# Patient Record
(demographics unavailable — no encounter records)

---

## 2020-01-01 NOTE — [id number]
[facility] History





-  Admission Detail


Date of Service: 20


Infant Delivery Method: Spontaneous Vaginal Delivery-Single





- Maternal History


Maternal MR Number: [id number]


: 1


Term: 0


Live Births: 1


Mother's Blood Type: B


Mother's Rh: Positive


Maternal Hepatitis B: Negative


Maternal STD: Negative (History of THC in urine in2020)


Maternal HIV: Negative


Maternal Group Beta Strep/GBS: Negative


Maternal VDRL: Negative


Maternal Urine Toxicology: Negative


Prenatal Care Received: Yes





- Delivery Data


APGAR Total Score 1 Minute: 9


APGAR Total Score 5 Minutes: 9


Resuscitation Effort: Bulb Suction, Dried and Stimulated, Place in Radiant 

Warmer


 Support Required: After Delivery of Infant





[facility] Nursery Information


Sex, Infant: Male


Weight: 3.42 kg


Length: 1 ft 9 in


Vital Signs: 


                                Last Vital Signs











Temp  99 F   20 12:15


 


Pulse  140   20 12:15


 


Resp  40   20 12:15


 


BP  71/57   20 12:15


 


Pulse Ox      











Head Circumference: 1 ft 1.75 in


Abdominal Girth: 1 ft 1 in


Bed Type: Open Crib





 Physician Exam





- Exam


Exam: See Below


Activity: Sleeping, Active





- [place] Scoring


Neuro Posture, NB: Flexion All Limbs


Neuro Square Window: Wrist 90 Degrees


Neuro Arm Recoil: Arm Recoil  Degrees


Neuro Popliteal Angle: Popliteal Angle 90 Degrees


Neuro Scarf Sign: Elbow at Same Side


Neuro Heel to Ear: Knee Bent to 90 Heel Reaches 90 Degrees from Prone


Neuro Maturity Score: 16


Physical Skin: Smooth, Pink, Visible Veins


Physical Lanugo: Mostly Bald


Physical Plantar Surface: Creases Over Entire Sole


Physical Breast: Full Areola, 5-10 mm Bud


Physical Eye/Ear: Thick Cartilage, Ear Stiff


Physical Genitals - Male: Testes Down, Good Rugae


Physical Maturity Score: 20


Maturity Ratin


Head: Face Symmetrical, Atraumatic, Normocephalic


Eyes: Bilateral: Normal Inspection, Red Reflex, Positive


Ears: Normal Appearance, Symmetrical


Nose: Normal Inspection, Normal Mucosa


Mouth: Nnormal Inspection, Palate Intact


Neck: Normal Inspection, Supple, Trachea Midline


Chest/Cardiovascular: Normal Appearance, Normal Peripheral Pulses, Regular Heart

Rate, Symmetrical


Respiratory: Lungs Clear, Normal Breath Sounds, No Respiratoy Distress


Abdomen/GI: Normal Bowel Sounds, No Mass, Symmetrical, Soft


Rectal: Normal Exam


Genitalia (Male): Normal Inspection


Spine/Skeletal: Normal Inspection, Normal Range of Motion


Extremities: Normal Inspection, Normal Capillary Refill, Normal Range of Motion


Skin: Dry, Intact, Normal Color, Warm





 Assessment and Plan


(1) Liveborn infant by vaginal delivery


SNOMED Code(s): 820356246, 155744518


   Code(s): Z38.00 - SINGLE LIVEBORN INFANT, DELIVERED VAGINALLY   Status: Acute

  Current Visit: Yes   


Problem List Initiated/Reviewed/Updated: Yes


Orders (Last 24 Hours): 


                               Active Orders 24 hr











 Category Date Time Status


 


 Patient Status [ADT] Routine ADT  20 11:33 Active


 


 Blood Glucose Check, Bedside [RC] ONETIME Care  20 12:25 Active


 


 [place] Hearing Screen [RC] ROUTINE Care  20 12:25 Active


 


  Intake and Output [RC] QSHIFT Care  20 12:25 Active


 


 Notify Provider [RC] PRN Care  20 12:25 Active


 


 Oxygen Therapy [RC] ASDIRECTED Care  20 12:25 Active


 


 Verify Patient Consent Obtain [RC] ASDIRECTED Care  20 12:25 Active


 


 Vital Measures,  [RC] Per Unit Routine Care  20 12:25 Active


 


 BILIRUBIN,  PROFILE [CHEM] Routine Lab  20 11:33 Ordered


 


  SCREENING (STATE) [POC] Routine Lab  20 11:33 Ordered


 


 Bacitracin/Neomycin/Polymyxin [Triple Antibiotic Oint] Med  20 12:25 

Active





 See Dose Instructions  TOP ASDIRECTED PRN   


 


 Dextrose [Glutose 15] Med  20 12:25 Active





 See Protocol  PO ONETIME PRN   


 


 Erythromycin Base [Erythromycin 0.5% Ophth Oint] Med  20 12:25 Active





 1 gm EYEBOTH ONETIME PRN   


 


 Lidocaine 1% [Xylocaine-MPF 1%] Med  20 12:25 Active





 See Dose Instructions  INJECT ONETIME PRN   


 


 Phytonadione [AquaMephyton] Med  20 12:25 Active





 1 mg IM ONETIME PRN   


 


 Sucrose [Sweet-Ease Natural] Med  20 12:25 Active





 2 ml PO ASDIRECTED PRN   


 


 Resuscitation Status Routine Resus Stat  20 12:25 Ordered








                                Medication Orders





Dextrose (Glutose 15)  0 gm PO ONETIME PRN; Protocol


   PRN Reason: Hypoglycemia


Erythromycin (Erythromycin 0.5% Ophth Oint)  1 gm EYEBOTH ONETIME PRN


   PRN Reason: For Delivery


   Last Admin: 20 12:46  Dose: 1 gm


   Documented by: FARHAN


Lidocaine HCl (Xylocaine-Mpf 1%)  0 ml INJECT ONETIME PRN


   PRN Reason: Circumcision


Neomycin/Polymyxin/Bacitracin (Triple Antibiotic Oint)  0 gm TOP ASDIRECTED PRN


   PRN Reason: circumcision


Phytonadione (Aquamephyton)  1 mg IM ONETIME PRN


   PRN Reason: For Delivery


   Last Admin: 20 12:46  Dose: 1 mg


   Documented by: FARHAN


Sucrose (Sweet-Ease Natural)  2 ml PO ASDIRECTED PRN


   PRN Reason: Circimcision








Plan: 





Anticipate normal  care.